# Patient Record
Sex: MALE | URBAN - METROPOLITAN AREA
[De-identification: names, ages, dates, MRNs, and addresses within clinical notes are randomized per-mention and may not be internally consistent; named-entity substitution may affect disease eponyms.]

---

## 2021-02-18 ENCOUNTER — OFFICE VISIT (OUTPATIENT)
Dept: URBAN - METROPOLITAN AREA CLINIC 7 | Facility: CLINIC | Age: 66
End: 2021-02-18
Payer: COMMERCIAL

## 2021-02-18 DIAGNOSIS — H43.812 VITREOUS DETACHMENT OF LEFT EYE: Primary | ICD-10-CM

## 2021-02-18 DIAGNOSIS — H25.13 AGE-RELATED NUCLEAR CATARACT, BILATERAL: ICD-10-CM

## 2021-02-18 DIAGNOSIS — H43.811 VITREOUS DEGENERATION, RIGHT EYE: ICD-10-CM

## 2021-02-18 DIAGNOSIS — H35.89 OTHER SPECIFIED RETINAL DISORDER: ICD-10-CM

## 2021-02-18 PROCEDURE — 99204 OFFICE O/P NEW MOD 45 MIN: CPT | Performed by: OPHTHALMOLOGY

## 2021-02-18 PROCEDURE — 92134 CPTRZ OPH DX IMG PST SGM RTA: CPT | Performed by: OPHTHALMOLOGY

## 2021-02-18 ASSESSMENT — INTRAOCULAR PRESSURE
OD: 15
OS: 13

## 2021-02-18 NOTE — IMPRESSION/PLAN
Impression: Other specified retinal disorder: H35.89. Left. Plan: Scleral depressed exam reveals a vitreoretinal tuft at the 3 o'clock position in the left eye. Discussed risk for developing a retinal tear and retinal detachment secondary to a vitreoretinal tuft. Reviewed signs and symptoms of a retinal tear and retinal detachment. Advised patient to contact us immediately for any new floaters, flashing lights, or a shadow in the vision.

## 2021-02-18 NOTE — IMPRESSION/PLAN
Impression: Vitreous detachment of left eye: H43.812. Plan: Scleral depressed exam reveals no retinal tear or retinal detachment in the left eye. OCT reveals no ERM/ME OU. Discussed diagnosis of a PVD. Discussed risk for developing a retinal tear and retinal detachment secondary to a PVD. Reviewed signs and symptoms of a retinal tear and retinal detachment. Advised patient to contact us immediately for any new floaters, flashing lights, or a shadow in the vision. 

Return PRN, OCT OU